# Patient Record
(demographics unavailable — no encounter records)

---

## 2025-03-08 NOTE — PHYSICAL EXAM
[General Appearance - Alert] : alert [General Appearance - In No Acute Distress] : in no acute distress [Delayed in the Right Toes] : capillary refills normal in right toes [Delayed in the Left Toes] : capillary refills normal in the left toes [No Joint Swelling] : no joint swelling [] : normal strength/tone [FreeTextEntry1] : Nails 1-5 left right painful, thickened, discolored, dystrophic with subungual debris, notably right hallux No open ulcerations or breaks in the integument bilateral  [Sensation] : the sensory exam was normal to light touch and pinprick [No Focal Deficits] : no focal deficits [Deep Tendon Reflexes (DTR)] : deep tendon reflexes were 2+ and symmetric [Motor Exam] : the motor exam was normal [Oriented To Time, Place, And Person] : oriented to person, place, and time [Impaired Insight] : insight and judgment were intact [Affect] : the affect was normal

## 2025-03-08 NOTE — HISTORY OF PRESENT ILLNESS
[Sneakers] : elly [FreeTextEntry1] : RADHA is a 60-year-old F present in the [facility] office for fungal nails. Patient states she had the fungus for 6 months or more. Patient states her nails are discolored and thick. Patient states she has acrylic on her right big hallux which she has not been able to remove. Patient states she gets her feet wet at work. Patient is now using steel toe shoes at work. Patient has no medical conditions.

## 2025-03-08 NOTE — ASSESSMENT
[FreeTextEntry1] : Discussed diagnosis and treatment with patient  Discussed etiology of symptoms patient is experiencing  Discussed all risks, complications, and benefits of topical vs. oral anti-fungal therapy. agreeable to both Rx terbinafine 250 mg po qd  Rx antifungal topical bid  Discussed proper shoe gear with patient  Discussed the importance of daily foot exams and pedal hygiene  Explained that the oral antifungal therapy will require 3 months in duration, and that patient must let nails grow out for total 9-12 months to observe clearance of nail mycosis. Patient was advised to discontinue the oral medication if experiencing jaundice or elevated Liver Function Tests. Patient verbalized understanding  Patient to return to the office in 1 month for repeat LFT